# Patient Record
(demographics unavailable — no encounter records)

---

## 2017-01-09 PROBLEM — Z30.41 ORAL CONTRACEPTIVE PILL SURVEILLANCE: Status: ACTIVE | Noted: 2017-01-09

## 2017-01-09 PROBLEM — Z01.419 ENCOUNTER FOR GYNECOLOGICAL EXAMINATION WITHOUT ABNORMAL FINDING: Status: ACTIVE | Noted: 2017-01-09

## 2017-01-09 PROCEDURE — 87491 CHLMYD TRACH DNA AMP PROBE: CPT | Performed by: OBSTETRICS & GYNECOLOGY

## 2017-01-09 PROCEDURE — 87591 N.GONORRHOEAE DNA AMP PROB: CPT | Performed by: OBSTETRICS & GYNECOLOGY

## 2017-01-09 PROCEDURE — 88175 CYTOPATH C/V AUTO FLUID REDO: CPT | Performed by: OBSTETRICS & GYNECOLOGY

## 2023-08-25 NOTE — PROGRESS NOTES
CHIEF COMPLAINT:  Patient presents with:  Consult: Missed menses , LMP 23,  MILES 24,     Pt is still nursing,      HPI:  Sheryl Tobar is 29year old, female, here today for a missed menses visit. Currently, she is feeling well. Denies 1st trimester danger signs. However, she stopped her Sertraline that she was taking postpartum for anxiety with the knowledge of this pregnancy (about 1 week ago) and is now feeling a roller coaster of emotions. Denies SI/HI. Not eager to go back on the medication. Wants to monitor symptoms. Reports H/O fainting episodes a couple of times in her life but then when hospitalized for prior birth she fainted a couple of times in postpartum. No prior workups. Reports family H/O cardiac disease. Accepts cardiology consult. Patient's last menstrual period was 2023. Period Cycle (Days): BREASTFEEDING  Period Duration (Days): 4 days  Use of Birth Control (if yes, specify type): None  Hx Prior Abnormal Pap: No  Pap Date: 17  Pap Result Notes: NORMAL RESULTS       10/2022 term , girl, 2721g (SGA), was IOL at 39wga for IUGR. Denies other obstetric complications (see note about fainting above)  Current, denies 1T danger signs  LMP 23 --> 6w0d --> MILES 24. Only menses since having given birth, Remains breastfeeding her 9mo child. /FOB: present, supportive, healthy  Family H/O of genetic disorders: denies  Cats at home: no. Instructed to not change manuel litter  Recent Travel outside [place] denies    HISTORY:  No past medical history on file. No past surgical history on file. No family history on file.    Social History:   Social History     Socioeconomic History    Marital status: Single   Tobacco Use    Smoking status: Never    Smokeless tobacco: Never   Substance and Sexual Activity    Alcohol use: No     Alcohol/week: 0.0 standard drinks of alcohol    Drug use: No    Sexual activity: Yes     Birth control/protection: Pill        Medications (Active prior to today's visit):  Current Outpatient Medications   Medication Sig Dispense Refill    Multiple Vitamins-Minerals (MULTIVITAMIN ADULT EXTRA C OR) Take 1 capsule by mouth daily. sertraline 25 MG Oral Tab Take 1 tablet (25 mg total) by mouth daily. (Patient not taking: Reported on 2023)         Allergies:  No Known Allergies    REVIEW OF SYSTEMS:  Review of Systems   Constitutional: Negative. Gastrointestinal: Negative. Genitourinary: Negative. PHYSICAL EXAM:   23  0901   BP: 115/74   Pulse: 82     Physical Exam  Vitals reviewed. Constitutional:       Appearance: Normal appearance. HENT:      Head: Normocephalic. Pulmonary:      Effort: Pulmonary effort is normal.   Neurological:      Mental Status: She is alert and oriented to person, place, and time. Psychiatric:         Behavior: Behavior normal.         Thought Content: Thought content normal.         Judgment: Judgment normal.          Recent Results (from the past 24 hour(s))   URINE PREGNANCY TEST    Collection Time: 23  9:06 AM   Result Value Ref Range    Pregnancy Test, Urine POSITIVE     Control Line Present with a clear background (yes/no) YES Yes/No    Kit Lot # [id number] Numeric    Kit Expiration Date 25 Date        ASSESSMENT/PLAN:   Shaina Sol was seen today for consult. Diagnoses and all orders for this visit:    Missed menses  -     URINE PREGNANCY TEST    Syncope, unspecified syncope type  -     [facility] CARDIOLOGY EXTERNAL    History of prior pregnancy with SGA   -     US PREG 1ST TRIMESTER (CPT=76801); Future    Short interval between pregnancies affecting pregnancy, antepartum  -     US PREG 1ST TRIMESTER (CPT=76801); Future    Today's UCG positive result reviewed with patient  Appropriate for CNM care   Already prenatal vitamins  One moderate risk factor bolded below.  No baby ASA indicated    Pre-eclampsia Risk Assessment:   High Risk Factors (any 1 of below):   - H/O preeclampsia in prior pregnancy  - Autoimmune disease (lupus, antiphospholipid syndrome)  - Multifetal pregnancy  - cHTN  - Diabetes  Moderate Risk Factors (any 2 of below)    - Nulliparus  - Obesity  - H/O preeclampsia in 1st degree relative  - Socioeconomic characeristics (AA race, low socioeconomic status)  - AMA  - Personal H/O prior SGA or low birth weight, adverse pregnancy outcome, >10yr pregnancy interval    Next visit: Patient to schedule Nurse Education visit, next available, and NOB for 12wga    Counseling included:  -- CNM care  -- Discussed best practice of weaning breast feeding in pregnancy  -- Discussed importance of folic acid, calcium, vitamin D  -- Reviewed pregnancy recommendations regarding weight gain, diet/hydration, and food safety  -- Travel discussed, avoid travel to zika zones, use of support stockings with flights longer than 3 hours, movement every 2-3 hours if driving  -- Discussed exercise  -- Discussed avoidance of Jacuzzis, saunas, hot tubs and steam rooms  -- Discussed avoidance of alcohol, smoking, and minimizing caffeine  -- Warning signs reviewed  -- Reviewed genetic/chromosomal screening   -- Evidence that baby ASA reduces risk of preeclampsia,  birth, and IUGR in at risk populations by about 10-20%   -- Schedule RN OB ED visit   -- 30 minutes face to face counseling, chart review, orders and coordination of care    Pt verbalized understanding. All questions answered.  No barriers to learning identified

## 2023-08-25 NOTE — TELEPHONE ENCOUNTER
Name and  verified. Per TM patient to schedule ultrasound In 1-2 weeks. Patient would not be able to schedule soon enough if order not put in stat. Patient verbalized understanding.